# Patient Record
Sex: FEMALE | Race: WHITE | NOT HISPANIC OR LATINO
[De-identification: names, ages, dates, MRNs, and addresses within clinical notes are randomized per-mention and may not be internally consistent; named-entity substitution may affect disease eponyms.]

---

## 2023-08-14 ENCOUNTER — APPOINTMENT (OUTPATIENT)
Dept: OBGYN | Facility: CLINIC | Age: 28
End: 2023-08-14
Payer: COMMERCIAL

## 2023-08-14 VITALS
SYSTOLIC BLOOD PRESSURE: 118 MMHG | HEIGHT: 62 IN | WEIGHT: 120 LBS | DIASTOLIC BLOOD PRESSURE: 82 MMHG | BODY MASS INDEX: 22.08 KG/M2

## 2023-08-14 DIAGNOSIS — Z78.9 OTHER SPECIFIED HEALTH STATUS: ICD-10-CM

## 2023-08-14 DIAGNOSIS — Z80.3 FAMILY HISTORY OF MALIGNANT NEOPLASM OF BREAST: ICD-10-CM

## 2023-08-14 DIAGNOSIS — Z33.2 ENCOUNTER FOR ELECTIVE TERMINATION OF PREGNANCY: ICD-10-CM

## 2023-08-14 DIAGNOSIS — Z01.419 ENCOUNTER FOR GYNECOLOGICAL EXAMINATION (GENERAL) (ROUTINE) W/OUT ABNORMAL FINDINGS: ICD-10-CM

## 2023-08-14 DIAGNOSIS — Z11.3 ENCOUNTER FOR SCREENING FOR INFECTIONS WITH A PREDOMINANTLY SEXUAL MODE OF TRANSMISSION: ICD-10-CM

## 2023-08-14 LAB
HCG UR QL: NEGATIVE
QUALITY CONTROL: YES

## 2023-08-14 PROCEDURE — 99385 PREV VISIT NEW AGE 18-39: CPT

## 2023-08-14 PROCEDURE — 99203 OFFICE O/P NEW LOW 30 MIN: CPT | Mod: 25

## 2023-08-14 PROCEDURE — 81025 URINE PREGNANCY TEST: CPT

## 2023-08-14 NOTE — HISTORY OF PRESENT ILLNESS
[Condoms] : uses condoms [Monogamous (Male Partner)] : is monogamous with a male partner [Y] : Positive pregnancy history [Patient reported PAP Smear was normal] : Patient reported PAP Smear was normal [TextBox_4] : 28 yo here for well woman exam. Reports dysmenorrhea first 2 days, takes Motrin occasionally Tylenol, which helps. Has had to call out of work due to pain. Also reports for last few months having more intense lower abdominal pain L>R around time of ovulation. Denies dyspareunia. Denies dyschezia. Occasional constipation.  [PapSmeardate] : 4 yrs ago [LMPDate] : 8/4/23 [MensesFreq] : 24 [MensesLength] : 3-4 [MensesAmount] : normal  [PGxTotal] : 1 [PGHxAbortions] : 1 [PGHxABInduced] : 1 [FreeTextEntry1] : Hx of ovarian cysts, denies fibroids, denies abnormal pap, STI. Has not had Gardasil vaccine.

## 2023-08-14 NOTE — PHYSICAL EXAM
[Chaperone Present] : A chaperone was present in the examining room during all aspects of the physical examination [FreeTextEntry1] : Sergio Peguero [Appropriately responsive] : appropriately responsive [Alert] : alert [No Acute Distress] : no acute distress [No Lymphadenopathy] : no lymphadenopathy [Soft] : soft [Non-tender] : non-tender [Non-distended] : non-distended [No HSM] : No HSM [No Lesions] : no lesions [No Mass] : no mass [Oriented x3] : oriented x3 [Examination Of The Breasts] : a normal appearance [No Discharge] : no discharge [No Masses] : no breast masses were palpable [Labia Majora] : normal [Labia Minora] : normal [Normal] : normal [Retroversion] : retroverted [Uterine Adnexae] : normal [FreeTextEntry8] : no uterosacral nodularity

## 2023-08-14 NOTE — DISCUSSION/SUMMARY
[FreeTextEntry1] : 26 yo here for well woman exam.  1) Health maintenance:  Pap + HPV GC/CT STI testing, blood collected in office by NAHED Peguero Gardasil handout provided  2) Contraception:  condoms  3) Ovulation pain, dysmenorrhea:  Pelvic ultrasound for further evaluation Discussed hormonal options of management, will consider her options  Return after ultrasound for follow-up results/management

## 2023-08-15 LAB
HIV1+2 AB SPEC QL IA.RAPID: NONREACTIVE
T PALLIDUM AB SER QL IA: NEGATIVE

## 2023-08-17 LAB
C TRACH RRNA SPEC QL NAA+PROBE: NOT DETECTED
HBV SURFACE AB SER QL: NONREACTIVE
HBV SURFACE AG SER QL: NONREACTIVE
HCV AB SER QL: NONREACTIVE
HCV S/CO RATIO: 0.15 S/CO
N GONORRHOEA RRNA SPEC QL NAA+PROBE: NOT DETECTED
SOURCE TP AMPLIFICATION: NORMAL

## 2023-08-18 LAB — CYTOLOGY CVX/VAG DOC THIN PREP: NORMAL

## 2023-08-26 ENCOUNTER — TRANSCRIPTION ENCOUNTER (OUTPATIENT)
Age: 28
End: 2023-08-26

## 2023-09-11 ENCOUNTER — APPOINTMENT (OUTPATIENT)
Dept: ULTRASOUND IMAGING | Facility: CLINIC | Age: 28
End: 2023-09-11
Payer: COMMERCIAL

## 2023-09-11 ENCOUNTER — OUTPATIENT (OUTPATIENT)
Dept: OUTPATIENT SERVICES | Facility: HOSPITAL | Age: 28
LOS: 1 days | End: 2023-09-11
Payer: COMMERCIAL

## 2023-09-11 DIAGNOSIS — N94.6 DYSMENORRHEA, UNSPECIFIED: ICD-10-CM

## 2023-09-11 PROCEDURE — 76856 US EXAM PELVIC COMPLETE: CPT

## 2023-09-11 PROCEDURE — 76830 TRANSVAGINAL US NON-OB: CPT

## 2023-09-11 PROCEDURE — 76856 US EXAM PELVIC COMPLETE: CPT | Mod: 26

## 2023-09-11 PROCEDURE — 76830 TRANSVAGINAL US NON-OB: CPT | Mod: 26

## 2023-09-20 ENCOUNTER — APPOINTMENT (OUTPATIENT)
Dept: OBGYN | Facility: CLINIC | Age: 28
End: 2023-09-20
Payer: COMMERCIAL

## 2023-09-20 DIAGNOSIS — N94.0 MITTELSCHMERZ: ICD-10-CM

## 2023-09-20 DIAGNOSIS — N83.202 UNSPECIFIED OVARIAN CYST, LEFT SIDE: ICD-10-CM

## 2023-09-20 DIAGNOSIS — N94.6 DYSMENORRHEA, UNSPECIFIED: ICD-10-CM

## 2023-09-20 PROCEDURE — 99213 OFFICE O/P EST LOW 20 MIN: CPT | Mod: 95

## 2023-10-09 ENCOUNTER — NON-APPOINTMENT (OUTPATIENT)
Age: 28
End: 2023-10-09